# Patient Record
Sex: MALE | Race: BLACK OR AFRICAN AMERICAN | NOT HISPANIC OR LATINO | URBAN - METROPOLITAN AREA
[De-identification: names, ages, dates, MRNs, and addresses within clinical notes are randomized per-mention and may not be internally consistent; named-entity substitution may affect disease eponyms.]

---

## 2019-07-14 ENCOUNTER — EMERGENCY (EMERGENCY)
Facility: HOSPITAL | Age: 29
LOS: 1 days | Discharge: HOME | End: 2019-07-14
Attending: EMERGENCY MEDICINE
Payer: SELF-PAY

## 2019-07-14 VITALS
TEMPERATURE: 97 F | HEART RATE: 73 BPM | HEIGHT: 65 IN | SYSTOLIC BLOOD PRESSURE: 118 MMHG | WEIGHT: 154.98 LBS | OXYGEN SATURATION: 98 % | RESPIRATION RATE: 18 BRPM | DIASTOLIC BLOOD PRESSURE: 72 MMHG

## 2019-07-14 PROCEDURE — 99283 EMERGENCY DEPT VISIT LOW MDM: CPT

## 2019-07-14 PROCEDURE — 99053 MED SERV 10PM-8AM 24 HR FAC: CPT

## 2019-07-14 PROCEDURE — 99282 EMERGENCY DEPT VISIT SF MDM: CPT

## 2019-07-14 RX ORDER — MICONAZOLE NITRATE 2 %
1 CREAM (GRAM) TOPICAL
Qty: 1 | Refills: 0
Start: 2019-07-14 | End: 2019-08-12

## 2019-07-14 NOTE — ED PROVIDER NOTE - CLINICAL SUMMARY MEDICAL DECISION MAKING FREE TEXT BOX
PT with stable VS, no acute distress, non toxic appearing, tolerating PO in the ED, PT with no s/s of secondary infection well appearing, PT will be placed on tropical, hold PO due to the need for proper follow up of liver enzymes. educated about when to return to the ED if needed. PT verbalizes that he understands all instructions and results.

## 2019-07-14 NOTE — ED PROVIDER NOTE - NS ED ROS FT

## 2019-07-14 NOTE — ED PROVIDER NOTE - OBJECTIVE STATEMENT
PT with no SPMHx presents to the ED with complaint of rash x1 wk. PT sates that he first noticed a lesson on THe lT side of his chest and then he noticed it had spread to his groin and then on the arms. pt states that rash is itch. PT tx at home with apple cider vinegar, hydrocortisone, PT states that he tx with Lotrimin spray wo improvement. PT denies fever, chills, weakness, numbness, tingling, loss of sensation, recent travel.

## 2021-05-07 ENCOUNTER — EMERGENCY (EMERGENCY)
Facility: HOSPITAL | Age: 31
LOS: 1 days | Discharge: HOME | End: 2021-05-07
Payer: MEDICARE

## 2021-05-07 VITALS
RESPIRATION RATE: 18 BRPM | SYSTOLIC BLOOD PRESSURE: 121 MMHG | HEART RATE: 68 BPM | DIASTOLIC BLOOD PRESSURE: 59 MMHG | OXYGEN SATURATION: 95 % | TEMPERATURE: 99 F | HEIGHT: 65 IN

## 2021-05-07 LAB — SARS-COV-2 RNA SPEC QL NAA+PROBE: DETECTED

## 2021-05-07 PROCEDURE — 99282 EMERGENCY DEPT VISIT SF MDM: CPT

## 2021-05-07 PROCEDURE — U0005: CPT

## 2021-05-07 PROCEDURE — 99283 EMERGENCY DEPT VISIT LOW MDM: CPT

## 2021-05-07 PROCEDURE — U0003: CPT

## 2021-05-07 NOTE — ED ADULT TRIAGE NOTE - CHIEF COMPLAINT QUOTE
patient requesting covid swab for upcoming travel. denies any recent contact with covid-19, asymptomatic at this time. in NAD in triage.

## 2021-05-07 NOTE — ED PROVIDER NOTE - PATIENT PORTAL LINK FT
You can access the FollowMyHealth Patient Portal offered by [facility] by registering at the following website: [email or web address]. By joining [facility]’s FollowMyHealth portal, you will also be able to view your health information using other applications (apps) compatible with our system.

## 2021-05-07 NOTE — ED PROVIDER NOTE - CLINICAL SUMMARY MEDICAL DECISION MAKING FREE TEXT BOX
Pt nontoxic appearing, stable vitals, ambulatory with stable saturation without supplemental oxygen. PT does not meet criteria listed in most updated guidelines as per [facility] protocol/algorithm for admission at this time. pt advised about self-quarantine instructions until negative test results and/or symptom resolution. pt advised on hand hygiene, monitoring of symptoms, antipyretic use as well as and fu with primary care provider. Instructions given in pre-printed copy.

## 2021-05-07 NOTE — ED PROVIDER NOTE - OBJECTIVE STATEMENT
Pt presenting to the ER for COVID-19 testing. Denies fevers chills, loss of taste or smell, URI symptoms, chest pain or shortness of breath, nausea vomiting diarrhea abdominal pain, weakness or fatigue. Eating and drinking normal diet. Normal output. Pt requesting testing at this time. Pt needs swab for travel.

## 2021-05-08 ENCOUNTER — EMERGENCY (EMERGENCY)
Facility: HOSPITAL | Age: 31
LOS: 1 days | Discharge: HOME | End: 2021-05-08
Payer: MEDICARE

## 2021-05-08 VITALS
TEMPERATURE: 99 F | SYSTOLIC BLOOD PRESSURE: 118 MMHG | HEART RATE: 65 BPM | DIASTOLIC BLOOD PRESSURE: 64 MMHG | OXYGEN SATURATION: 98 % | WEIGHT: 134.92 LBS | RESPIRATION RATE: 20 BRPM | HEIGHT: 65 IN

## 2021-05-08 LAB — SARS-COV-2 RNA SPEC QL NAA+PROBE: DETECTED

## 2021-05-08 PROCEDURE — U0005: CPT

## 2021-05-08 PROCEDURE — U0003: CPT

## 2021-05-08 PROCEDURE — 99283 EMERGENCY DEPT VISIT LOW MDM: CPT

## 2021-05-08 PROCEDURE — 99282 EMERGENCY DEPT VISIT SF MDM: CPT

## 2021-05-08 NOTE — ED ADULT TRIAGE NOTE - WEIGHT IN LBS
Closure 4 Information: This tab is for additional flaps and grafts above and beyond our usual structured repairs.  Please note if you enter information here it will not currently bill and you will need to add the billing information manually. [phone]

## 2021-05-08 NOTE — ED PROVIDER NOTE - OBJECTIVE STATEMENT
29 y/o male presents for COVID test for travel. PT had positive test yesterday. he states " The test is wrong" Denies any symptoms

## 2021-05-08 NOTE — ED PROVIDER NOTE - PATIENT PORTAL LINK FT
You can access the FollowMyHealth Patient Portal offered by [facility] by registering at the following website: [email or web address]. By joining [employer] [facility] [employer]’s FollowMyHealth portal, you will also be able to view your health information using other applications (apps) compatible with our system.

## 2022-05-03 ENCOUNTER — EMERGENCY (EMERGENCY)
Facility: HOSPITAL | Age: 32
LOS: 1 days | Discharge: HOME | End: 2022-05-03
Attending: EMERGENCY MEDICINE
Payer: SELF-PAY

## 2022-05-03 VITALS
SYSTOLIC BLOOD PRESSURE: 121 MMHG | TEMPERATURE: 98 F | WEIGHT: 145.06 LBS | RESPIRATION RATE: 16 BRPM | OXYGEN SATURATION: 99 % | HEIGHT: 65 IN | DIASTOLIC BLOOD PRESSURE: 78 MMHG | HEART RATE: 75 BPM

## 2022-05-03 PROCEDURE — 99282 EMERGENCY DEPT VISIT SF MDM: CPT

## 2022-05-03 NOTE — ED PROVIDER NOTE - OBJECTIVE STATEMENT
32yo M no pmhx presents to ED c/o lump to right anterior shoulder. pt first noticed lump last night. States it is not painful unless he presses down directly on top of it. No recent injury/trauma. Denies erythema, drainage, cp, sob.

## 2022-05-03 NOTE — ED PROVIDER NOTE - NSFOLLOWUPINSTRUCTIONS_ED_ALL_ED_FT
- Return to the ED for any new or worsening symptoms.   - Follow-up with surgery clinic for further evaluation

## 2022-05-03 NOTE — ED PROVIDER NOTE - PHYSICAL EXAMINATION
Gen: No acute distress, non toxic  HEENT: Mucous membranes moist, pink conjunctivae, EOMI  Neuro: A&O x 3, moving all 4 extremities  MSK: No spine or joint tenderness to palpation  Skin: +1.5cm diameter freely mobile nodular growth to right anterior shoulder. No erythema or skin changes. Non-tender. Small chronic keloid scar to shoulder.

## 2022-05-03 NOTE — ED PROVIDER NOTE - NS ED ATTENDING STATEMENT MOD
This was a shared visit with the FRANCISCO. I reviewed and verified the documentation and independently performed the documented:

## 2022-05-03 NOTE — ED PROVIDER NOTE - NSFOLLOWUPCLINICS_GEN_ALL_ED_FT
[facility] Acute Care Surgery  Acute Care Surgery  [address] [place] [id number]  Phone: [phone]  Fax:

## 2022-05-03 NOTE — ED PROVIDER NOTE - ATTENDING APP SHARED VISIT CONTRIBUTION OF CARE
I, Jocelynn Chauhan, performed the initial face to face bedside interview with this patient regarding history of present illness, review of symptoms and relevant past medical, social and family history.  I completed an independent physical examination.  I was the initial provider who evaluated this patient. I have signed out the follow up of any pending tests (i.e. labs, radiological studies) to the ACP.  I have communicated the patient’s plan of care and disposition with the ACP.  The history, relevant review of systems, past medical and surgical history, medical decision making, and physical examination was documented by the scribe in my presence and I attest to the accuracy of the documentation. I, Jocelynn Chauhan, performed the initial face to face bedside interview with this patient regarding history of present illness, review of symptoms and relevant past medical, social and family history.  I completed an independent physical examination.  I was the initial provider who evaluated this patient. I have signed out the follow up of any pending tests (i.e. labs, radiological studies) to the ACP.  I have communicated the patient’s plan of care and disposition with the ACP.

## 2022-05-03 NOTE — ED PROVIDER NOTE - PATIENT PORTAL LINK FT
You can access the FollowMyHealth Patient Portal offered by Health system by registering at the following website: [email or web address]. By joining [employer]’s FollowMyHealth portal, you will also be able to view your health information using other applications (apps) compatible with our system.

## 2022-05-03 NOTE — ED PROVIDER NOTE - CLINICAL SUMMARY MEDICAL DECISION MAKING FREE TEXT BOX
30yo M presenting c/o lump to right anterior shoulder. no signs infection, no erythema/induration. likely subcutaneous cyst. pt advised he needs outpt f/u for aspiration to confirm and possible excision

## 2022-05-03 NOTE — ED PROVIDER NOTE - NS ED ROS FT
Gen: denies fever, chills, fatigue, weight loss  Skin: +Lump right shoulder. denies rashes, laceration, bruising  HEENT: denies visual changes, ear pain, nasal congestion, throat pain  MSK: denies joint swelling/pain, back pain, neck pain  Neuro: denies headache, dizziness, weakness, numbness

## 2022-12-08 ENCOUNTER — EMERGENCY (EMERGENCY)
Facility: HOSPITAL | Age: 32
LOS: 1 days | Discharge: HOME | End: 2022-12-08
Attending: EMERGENCY MEDICINE
Payer: COMMERCIAL

## 2022-12-08 VITALS
RESPIRATION RATE: 18 BRPM | OXYGEN SATURATION: 96 % | HEART RATE: 101 BPM | DIASTOLIC BLOOD PRESSURE: 73 MMHG | TEMPERATURE: 100 F | SYSTOLIC BLOOD PRESSURE: 125 MMHG

## 2022-12-08 VITALS
HEART RATE: 64 BPM | TEMPERATURE: 99 F | OXYGEN SATURATION: 96 % | RESPIRATION RATE: 18 BRPM | DIASTOLIC BLOOD PRESSURE: 66 MMHG | SYSTOLIC BLOOD PRESSURE: 117 MMHG

## 2022-12-08 LAB
FLUAV AG NPH QL: SIGNIFICANT CHANGE UP
FLUBV AG NPH QL: SIGNIFICANT CHANGE UP
RSV RNA NPH QL NAA+NON-PROBE: SIGNIFICANT CHANGE UP
SARS-COV-2 RNA SPEC QL NAA+PROBE: DETECTED

## 2022-12-08 PROCEDURE — 99284 EMERGENCY DEPT VISIT MOD MDM: CPT

## 2022-12-08 PROCEDURE — 71046 X-RAY EXAM CHEST 2 VIEWS: CPT | Mod: 26

## 2022-12-08 PROCEDURE — 93010 ELECTROCARDIOGRAM REPORT: CPT

## 2022-12-08 RX ORDER — IBUPROFEN 200 MG
600 TABLET ORAL ONCE
Refills: 0 | Status: COMPLETED | OUTPATIENT
Start: 2022-12-08 | End: 2022-12-08

## 2022-12-08 RX ORDER — ACETAMINOPHEN 500 MG
650 TABLET ORAL ONCE
Refills: 0 | Status: COMPLETED | OUTPATIENT
Start: 2022-12-08 | End: 2022-12-08

## 2022-12-08 RX ADMIN — Medication 650 MILLIGRAM(S): at 03:35

## 2022-12-08 RX ADMIN — Medication 600 MILLIGRAM(S): at 03:35

## 2022-12-08 NOTE — ED PROVIDER NOTE - ATTENDING APP SHARED VISIT CONTRIBUTION OF CARE
31 yo M with cough, fever and URI s/s x last 3 days. Pt not vaccinated for COVID and had prior episode x 1.   Taking OTC meds with some relief.  Pt c/o intermittent CP secondary to cough.  CXR and EKG normal,  + COVID.  Pt refusing Paxlovid and is stable for d./c with supportive and symptomatic treatment

## 2022-12-08 NOTE — ED PROVIDER NOTE - OBJECTIVE STATEMENT
32 year old male no PMHx presents with fever, chills, cough x 3 days. Pt reports cough with congestion started 3 days ago. Pt reports having chills, sweats, subjective fevers. Pt reports taking dayquil and nightquil with some relfief. Pt reports having intermittent chest pain worse with cough during this time. Pt states he came to ED tonight because he wanted to be swabbed. Pt denies SOB, dizziness, lightheadedness, HA, palpations, nausea, vomiting, abdominal pain. 32 year old male no PMHx presents with fever, chills, cough x 3 days. Pt reports cough with congestion started 3 days ago. Pt reports having chills, sweats, subjective fevers. Pt reports taking dayquil and nightquil with some relfief. Pt reports having intermittent chest pain worse with cough during this time. Pt states he came to ED tonight because he wanted to be swabbed. Pt denies SOB, dizziness, lightheadedness, HA, palpations, nausea, vomiting, abdominal pain. Pt reports being unvaccinated for covid/flu.

## 2022-12-08 NOTE — ED PROVIDER NOTE - PATIENT PORTAL LINK FT
You can access the FollowMyHealth Patient Portal offered by [facility] by registering at the following website: [email or web address]. By joining [facility]’s FollowMyHealth portal, you will also be able to view your health information using other applications (apps) compatible with our system.

## 2022-12-08 NOTE — ED ADULT NURSE NOTE - OBJECTIVE STATEMENT
Assumed care of pt at [address] in [address]. Pt A&Ox4 c/o fever. pt is afebrile in ED. When asked why pt is here pt stated, " I don't know,". Pt RR Even and unlabored.

## 2022-12-08 NOTE — ED ADULT TRIAGE NOTE - CHIEF COMPLAINT QUOTE
Pt presents to ED c/o fever and chills since Monday night. Pt states "I took it once and then I stopped, I don't know man" when asked to elaborate on his symptoms.